# Patient Record
Sex: FEMALE | Race: ASIAN | NOT HISPANIC OR LATINO | URBAN - METROPOLITAN AREA
[De-identification: names, ages, dates, MRNs, and addresses within clinical notes are randomized per-mention and may not be internally consistent; named-entity substitution may affect disease eponyms.]

---

## 2024-04-18 ENCOUNTER — EMERGENCY (EMERGENCY)
Facility: HOSPITAL | Age: 29
LOS: 1 days | Discharge: HOME | End: 2024-04-18
Attending: EMERGENCY MEDICINE | Admitting: EMERGENCY MEDICINE
Payer: MEDICAID

## 2024-04-18 VITALS
DIASTOLIC BLOOD PRESSURE: 78 MMHG | HEART RATE: 80 BPM | TEMPERATURE: 97 F | SYSTOLIC BLOOD PRESSURE: 111 MMHG | OXYGEN SATURATION: 100 % | RESPIRATION RATE: 18 BRPM

## 2024-04-18 DIAGNOSIS — F43.20 ADJUSTMENT DISORDER, UNSPECIFIED: ICD-10-CM

## 2024-04-18 PROCEDURE — 93010 ELECTROCARDIOGRAM REPORT: CPT

## 2024-04-18 PROCEDURE — 99284 EMERGENCY DEPT VISIT MOD MDM: CPT

## 2024-04-18 PROCEDURE — 71046 X-RAY EXAM CHEST 2 VIEWS: CPT | Mod: 26
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
## 2024-04-18 NOTE — ED BEHAVIORAL HEALTH ASSESSMENT NOTE - DESCRIPTION
domiciled with family, unemployed, graduated with Master's degree Calm and cooperative in the ED, no medications required.    ICU Vital Signs Last 24 Hrs  T(C): 36.3 (18 Apr 2024 19:28), Max: 36.3 (18 Apr 2024 19:28)  T(F): 97.3 (18 Apr 2024 19:28), Max: 97.3 (18 Apr 2024 19:28)  HR: 80 (18 Apr 2024 19:28) (80 - 80)  BP: 111/78 (18 Apr 2024 19:28) (111/78 - 111/78)  BP(mean): --  ABP: --  ABP(mean): --  RR: 18 (18 Apr 2024 19:28) (18 - 18)  SpO2: 100% (18 Apr 2024 19:28) (100% - 100%)    O2 Parameters below as of 18 Apr 2024 19:28  Patient On (Oxygen Delivery Method): room air denies
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
## 2024-04-18 NOTE — ED PROVIDER NOTE - NSFOLLOWUPINSTRUCTIONS_ED_ALL_ED_FT
Advance activity as tolerated.  Continue all previously prescribed medications as directed unless otherwise instructed.  Follow up with your primary care physician and the crisis center in 48-72 hours- bring copies of your results.  Return to the ER for worsening or persistent symptoms, and/or ANY NEW OR CONCERNING SYMPTOMS. If you have issues obtaining follow up, please call: [phone] to obtain a doctor or specialist who takes your insurance in your area.  You may call [phone] to make an appointment with the internal medicine clinic.

## 2024-04-18 NOTE — ED BEHAVIORAL HEALTH ASSESSMENT NOTE - RISK ASSESSMENT
Risk factors: impulsivity, poor frustration tolerance    Protective factors: no current SIIP/HIIP, no h/o SA/SIB, no h/o psych admissions, no access to weapons, no active substance abuse, good physical health, no psychosis, domiciled, social supports, help-seeking behaviors    Overall, pt is a low risk of harm to self/others and does not meet criteria for psychiatric admission.
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
## 2024-04-18 NOTE — ED PROVIDER NOTE - PATIENT PORTAL LINK FT
You can access the FollowMyHealth Patient Portal offered by [facility] by registering at the following website: [email or web address]. By joining [employer]’s FollowMyHealth portal, you will also be able to view your health information using other applications (apps) compatible with our system.
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
